# Patient Record
Sex: FEMALE | Race: OTHER
[De-identification: names, ages, dates, MRNs, and addresses within clinical notes are randomized per-mention and may not be internally consistent; named-entity substitution may affect disease eponyms.]

---

## 2022-05-30 ENCOUNTER — HOSPITAL ENCOUNTER (EMERGENCY)
Dept: HOSPITAL 61 - ER | Age: 47
Discharge: HOME | End: 2022-05-30
Payer: COMMERCIAL

## 2022-05-30 VITALS — SYSTOLIC BLOOD PRESSURE: 94 MMHG | DIASTOLIC BLOOD PRESSURE: 60 MMHG

## 2022-05-30 VITALS — HEIGHT: 61 IN | BODY MASS INDEX: 19.48 KG/M2 | WEIGHT: 103.18 LBS

## 2022-05-30 DIAGNOSIS — T78.40XA: Primary | ICD-10-CM
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
## 2022-05-30 PROCEDURE — 96374 THER/PROPH/DIAG INJ IV PUSH: CPT

## 2022-05-30 PROCEDURE — 99284 EMERGENCY DEPT VISIT MOD MDM: CPT

## 2022-05-30 PROCEDURE — 96375 TX/PRO/DX INJ NEW DRUG ADDON: CPT

## 2022-05-30 PROCEDURE — 96376 TX/PRO/DX INJ SAME DRUG ADON: CPT

## 2022-05-30 NOTE — PHYS DOC
Past Medical History


Past Surgical History:  No Surgical History


Smoking Status:  Never Smoker


Alcohol Use:  None





General Adult


EDM:


Chief Complaint:  ALLERGIC REACTION





HPI:


HPI:





Patient is a 47  year old female who presents with swelling on her face and rash

on her arms.  Onset yesterday.  Denies any known allergies.  Denies taking any 

recent medications.  Denies any significant past medical history.  She does not 

see a PCP regularly.  No fever or chills.  No involvement of the tongue or the 

eyes.  Her main concern is the itching.  She has no pain over the rash area.





Review of Systems:


Review of Systems:


Constitutional:   Denies fever or chills. []


Eyes:   Denies change in visual acuity. []


HENT:   Denies nasal congestion or sore throat. [] 


Respiratory:   Denies cough or shortness of breath. [] 


Cardiovascular:   Denies chest pain or edema. [] 


GI:   Denies abdominal pain, nausea, vomiting, bloody stools or diarrhea. [] 


:  Denies dysuria. [] 


Musculoskeletal:   Denies back pain or joint pain. [] 


Integument:   Positive for rash


Neurologic:   Denies headache, focal weakness or sensory changes. [] 


Endocrine:   Denies polyuria or polydipsia. [] 


Lymphatic:  Denies swollen glands. [] 


Psychiatric:  Denies depression or anxiety. []





Heart Score:


C/O Chest Pain:  No


Risk Factors:


Risk Factors:  DM, Current or recent (<one month) smoker, HTN, HLP, family 

history of CAD, obesity.


Risk Scores:


Score 0 - 3:  2.5% MACE over next 6 weeks - Discharge Home


Score 4 - 6:  20.3% MACE over next 6 weeks - Admit for Clinical Observation


Score 7 - 10:  72.7% MACE over next 6 weeks - Early Invasive Strategies





Current Medications:





Current Medications








 Medications


  (Trade)  Dose


 Ordered  Sig/[place]  Start Time


 Stop Time Status Last Admin


Dose Admin


 


 Diphenhydramine


 HCl


  (Benadryl)  25 mg  1X  ONCE  5/30/22 09:45


 5/30/22 09:46 DC 5/30/22 09:40


25 MG


 


 Famotidine


  (Pepcid Vial)  20 mg  1X  ONCE  5/30/22 07:30


 5/30/22 07:41 DC 5/30/22 07:50


20 MG


 


 Methylprednisolone


 Sodium Succinate


  (SOLU-Medrol


 40MG VIAL)  40 mg  1X  ONCE  5/30/22 07:30


 5/30/22 07:41 DC 5/30/22 07:49


40 MG











Allergies:


Allergies:





Allergies








Coded Allergies Type Severity Reaction Last Updated Verified


 


  No Known Drug Allergies    5/30/22 No











Physical Exam:


PE:





Constitutional: Well developed, well nourished, no acute distress, non-toxic 

appearance. []


HENT: Normocephalic, atraumatic, bilateral external ears normal, oropharynx 

moist, no oral exudates, nose normal. []


Eyes: PERRLA, EOMI, conjunctiva normal, no discharge. [] 


Neck: Normal range of motion, no tenderness, supple, no stridor. [] 


Cardiovascular:Heart rate regular rhythm, no murmur []


Lungs & Thorax:  Bilateral breath sounds clear to auscultation []


Abdomen: Bowel sounds normal, soft, no tenderness, no masses, no pulsatile 

masses. [] 


Skin: Raised [place] area over both arms and erythema with slight swelling over 

bilaterally on the face


Back: No tenderness, no CVA tenderness. [] 


Extremities: No tenderness, no cyanosis, no clubbing, ROM intact, no edema. [] 


Neurologic: Alert and oriented X 3, normal motor function, normal sensory 

function, no focal deficits noted. []


Psychologic: Affect normal, judgement normal, mood normal.





Current Patient Data:


Vital Signs:





                                   Vital Signs








  Date Time  Temp Pulse Resp B/P (MAP) Pulse Ox O2 Delivery O2 Flow Rate FiO2


 


5/30/22 09:22  70 14 94/60 (71) 97   


 


5/30/22 07:20 98.6     Room Air  





 98.6       











EKG:


EKG:


[]





Radiology/Procedures:


Radiology/Procedures:


[]





Course & Med Decision Making:


Course & Med Decision Making


I was concerned given the rash and the appearance of the face.  Does not look 

like staph scalded skin at this time.  Patient did not use any medications.  

Vital signs are stable.  Patient is afebrile.  Patient looks and feels much 

improved after allergic reaction medications were given.  Will discharge 

appropriately with prompt return precautions if she spikes a fever or if the 

swelling gets worse.  There is no involvement of the lips or the tongue.





Dragon Disclaimer:


Dragon Disclaimer:


This electronic medical record was generated, in whole or in part, using a voice

 recognition dictation system.





Departure


Departure


Impression:  


   Primary Impression:  


   Allergic reaction


Disposition:  01 HOME / SELF CARE / HOMELESS


Condition:  IMPROVED


Patient Instructions:  Rash





Additional Instructions:  


Please return if you have any trouble swallowing or your lips or tongue swell.  

Please also return if you have any nausea or vomiting or fever.


Scripts


Methylprednisolone (MEDROL) 4 Mg Tab.ds.pk


1 PKG PO UD, #1 PKG


   Prov: DB ACOSTA MD         5/30/22 


Diphenhydramine Hcl (BENADRYL) 25 Mg Capsule


1 CAP PO Q6HRS for 2 Days, #8 CAP 0 Refills


   Prov: DB ACOSTA MD         5/30/22











DB ACOSTA MD                 May 30, 2022 11:39